# Patient Record
(demographics unavailable — no encounter records)

---

## 2024-12-23 NOTE — HISTORY OF PRESENT ILLNESS
[de-identified] : 48 yo F with PMH of HTN, HLD who recently visited to [facility] ED (10/2/24) for headache x9 days. Pt reports headache that started 9 days ago. Headache is located on the right side of her head and occasionally radiates to the R eye, but today she reports some pain on the left side as well. Pain is constant and feels like a tightness, but had an episode of severe, stabbing-like pain x2 days ago. Headache is worse with talking and movement, and is slightly better with Tylenol. Pt also reports lightheadedness and feeling unbalanced with movement that started a week ago. Pt denies excessive tearing, vision changes, n/v/d, chest pain, weakness, changes in sensation. Denies recent travel or illness. FHx HTN, DM, CAD in father. CTA showed 2 mm infundibulum in LEFT posterior communicating artery off the supraclinoid segment of L ICA and additional 1 mm outpouching in supraclinoid L ICA origin of the left anterior choroidal artery. Dissue beaded cervical internal carotid artery consistent with fibromuscular dysplasia but no significant stenosis/occlusion.  Initial visit (11/18/24) CTA head was reviewed which showed fibromuscular dysplasia and two small aneurysms and plan was made to obtain brain MRI and MRA head/neck.

## 2024-12-23 NOTE — DATA REVIEWED
[de-identified] : head wo, MRA head/neck wo on 12/8/24 in PACS  ACC: [id number]     EXAM:  MR ANGIO NECK   ORDERED BY: SUMI LUNA  ACC: [id number]     EXAM:  MR BRAIN WAW IC   ORDERED BY: SUMI LUNA  ACC: [id number]     EXAM:  MR ANGIO BRAIN   ORDERED BY: SUMI LUNA  PROCEDURE DATE:  12/08/2024    INTERPRETATION:  Three examinations were performed: 1.  MR angiography neck circulation without gadolinium contrast 2.  MR angiography intracranial circulation without gadolinium contrast 3.  MR brain with and without gadolinium contrast   CLINICAL INFORMATION:    HEADACHE AMR  TECHNIQUE: 1.  Neck circulation:   MR angiography was performed from the arch to the skull base using two-dimensional time-of-flight technique. This data set was reconstructed as maximum intensity pixel images and displayed in multiple rotations.   Supplemental three-dimensional acquisition centered at the carotid bifurcations was also performed, reconstructed as maximum intensity pixel images and displayed in multiple rotations. 2.  Intracranial circulation:  MR angiography was performed using three-dimensional time-of-flight technique.  This data set was reconstructed as maximum intensity pixel images and displayed in multiple rotations. 3.  Brain:  Sagittal and axial T1-weighted images, axial FLAIR images, axial susceptibility weighted images, axial T2-weighted images and axial diffusion weighted images of the brain were obtained. Following gadolinium administration volumetric axial T1-weighted inversion recovery fast gradient recalled echo images were obtained. This data set was reconstructed in the arteries on the sagittal and coronal planes. CONTRAST:    Gadavist:     7 cc administered  ;  0 cc discarded  COMPARISON:  CT head and CT angiography 10/2/2024   FINDINGS:  NECK ARTERIAL CIRCULATION  The RIGHT CAROTID circulation demonstrates an intact common carotid artery. The carotid bulb appears intact.   The internal carotid artery demonstrates mural irregularity over its entire length, greatest in its distal aspect, without focal stenosis. The vessel remains patent to the skull base.  The LEFT CAROTID circulation demonstrates an intact common carotid artery. Incidentally noted is origin of the left common carotid artery from a common trunk with the innominate artery (bovine arch).   The carotid bulb demonstrates mural irregularity greatest in its middle third, without focal stenosis.   The internal carotid artery is patent to the skull base.  The vertebral circulation demonstrates patent right and left vertebral arteries.  The degree of vertebral asymmetry is within physiologic variation.  Each vertebral artery demonstrates near constant caliber from its origin to the foramen magnum.  INTRACRANIAL ARTERIAL CIRCULATION  The ANTERIOR circulation demonstrates intact inflow from the ascending cervical segment to the petrous segment of each internal carotid artery. The cavernous and clinoid segments of the right internal carotid artery appear intact. At the left internal carotid clinoid segment along its posterior surface near the expected origin of the left posterior communicating artery (image 147) is lobulated outpouching that measures approximately 0.2 cm parallel to the dorsal surface by 0.1 cm perpendicular. There is asymmetry of internal carotid arterial caliber, left larger than right, secondary to developmental variation and asymmetry of the intracranial vasculature distributions.  The left anterior cerebral arterial A1 segment is dominant caliber. The A1 segment on the right is absent.  An anterior communicating artery is present. The anterior cerebral arterial A2 segments are near symmetric in caliber and patent to peripheral branching. The right middle cerebral artery demonstrates an intact initial M1 segment and patent peripheral anterior and posterior division sylvian branches.  The left middle cerebral artery demonstrates an intact initial M1 segment and patent peripheral anterior and posterior division sylvian branches.  The POSTERIOR circulation demonstrates intact inflow from each vertebral artery.   The right vertebral artery is dominant caliber. The small caliber left vertebral artery is tiny caliber distal to its plain subacute she was to the basilar formation.    PICA artery is demonstrated on the left, not definitely identified on the right.  The basilar artery appears intact.  Superior cerebellar arteries are demonstrated.  Posterior communicating artery tiny caliber is demonstrated on the left, not clearly seen on the right.   Each posterior cerebral artery is patent to peripheral branching.  No arteriovenous malformation  is recognized.  Note that small intracranial aneurysms less than 0.5 cm may not be detected by this technique.  BRAIN  BRAIN PARENCHYMA:   The brain demonstrates few small indistinct lesions within the subcortical white matter of the cerebral hemispheres. These lesions are hyperintense on the long TR images, otherwise inconspicuous. No cerebral cortical lesion is recognized.   No diffusion restriction is found in the brain.  No acute cerebral cortical infarct is found.   No intracranial hemorrhage is recognized.  No mass effect is found in the brain.   Following gadolinium administration no abnormal brain parenchymal enhancement is found.  CSF SPACES:   The ventricles, sulci and basal cisterns appear intact.  VESSELS:   The vertebral and internal carotid arteries demonstrate expected flow voids on the long TR images indicating their patency. The principal dural sinuses enhance consistent with patency  HEAD AND NECK STRUCTURES:   The orbits demonstrate asymmetric anterior to posterior elongation of the right globe.  Paranasal sinuses are clear.  The nasal cavity demonstrates additional left right septal deviation. Bilateral middle turbinate kiko bullosa are present.  The central skull base appears intact.  The nasopharynx is symmetric.  The temporal bones appear clear of disease.  The calvarium appears unremarkable.  ADDITIONAL FINDINGS:    None   IMPRESSION:  1.  RIGHT CAROTID NECK CIRCULATION:   No hemodynamically significant stenosis. Mural irregularity suggests probable muscular dysplasia.  2.  LEFT CAROTID NECK CIRCULATION:    No hemodynamically significant stenosis. Mural irregularity suggests probable muscular dysplasia.  3.  VERTEBRAL NECK CIRCULATION:   Intact  4.  ANTERIOR INTRACRANIAL CIRCULATION:     Patent. Left internal carotid distal clinoid segment tiny lobulation, infundibulum of the posterior communicating artery favored over saccular aneurysm  5.  POSTERIOR INTRACRANIAL CIRCULATION:   Intact.  6.  BRAIN:   No evidence of acute infarction. No pathologic enhancement.   Cerebral hemispheric subcortical white matter lesions are nonspecific. The pattern suggests migraine disease. Subcortical lesions of ischemic white matter disease could have a similar appearance. The differential diagnosis for these lesions remains broad. This differential diagnosis would also include other inflammatory, infectious, demyelinating, metabolic and conceivably other etiologies. Please note, however, that the extent of this disease is slight.  --- End of Report ---      PARVEEN WHALEN MD; Attending Radiologist This document has been electronically signed. Dec  9 2024 10:40AM

## 2024-12-23 NOTE — REASON FOR VISIT
[Follow-Up: _____] : a [unfilled] follow-up visit [FreeTextEntry1] : Recent image finding of aneurysm returns to review MRI brain, MRA head/neck (PACS)

## 2024-12-23 NOTE — DATA REVIEWED
[de-identified] : head wo, MRA head/neck wo on 12/8/24 in PACS  ACC: [id number]     EXAM:  MR ANGIO NECK   ORDERED BY: SUMI LUNA  ACC: [id number]     EXAM:  MR BRAIN WAW IC   ORDERED BY: SUMI LUNA  ACC: [id number]     EXAM:  MR ANGIO BRAIN   ORDERED BY: SUMI LUNA  PROCEDURE DATE:  12/08/2024    INTERPRETATION:  Three examinations were performed: 1.  MR angiography neck circulation without gadolinium contrast 2.  MR angiography intracranial circulation without gadolinium contrast 3.  MR brain with and without gadolinium contrast   CLINICAL INFORMATION:    HEADACHE AMR  TECHNIQUE: 1.  Neck circulation:   MR angiography was performed from the arch to the skull base using two-dimensional time-of-flight technique. This data set was reconstructed as maximum intensity pixel images and displayed in multiple rotations.   Supplemental three-dimensional acquisition centered at the carotid bifurcations was also performed, reconstructed as maximum intensity pixel images and displayed in multiple rotations. 2.  Intracranial circulation:  MR angiography was performed using three-dimensional time-of-flight technique.  This data set was reconstructed as maximum intensity pixel images and displayed in multiple rotations. 3.  Brain:  Sagittal and axial T1-weighted images, axial FLAIR images, axial susceptibility weighted images, axial T2-weighted images and axial diffusion weighted images of the brain were obtained. Following gadolinium administration volumetric axial T1-weighted inversion recovery fast gradient recalled echo images were obtained. This data set was reconstructed in the arteries on the sagittal and coronal planes. CONTRAST:    Gadavist:     7 cc administered  ;  0 cc discarded  COMPARISON:  CT head and CT angiography 10/2/2024   FINDINGS:  NECK ARTERIAL CIRCULATION  The RIGHT CAROTID circulation demonstrates an intact common carotid artery. The carotid bulb appears intact.   The internal carotid artery demonstrates mural irregularity over its entire length, greatest in its distal aspect, without focal stenosis. The vessel remains patent to the skull base.  The LEFT CAROTID circulation demonstrates an intact common carotid artery. Incidentally noted is origin of the left common carotid artery from a common trunk with the innominate artery (bovine arch).   The carotid bulb demonstrates mural irregularity greatest in its middle third, without focal stenosis.   The internal carotid artery is patent to the skull base.  The vertebral circulation demonstrates patent right and left vertebral arteries.  The degree of vertebral asymmetry is within physiologic variation.  Each vertebral artery demonstrates near constant caliber from its origin to the foramen magnum.  INTRACRANIAL ARTERIAL CIRCULATION  The ANTERIOR circulation demonstrates intact inflow from the ascending cervical segment to the petrous segment of each internal carotid artery. The cavernous and clinoid segments of the right internal carotid artery appear intact. At the left internal carotid clinoid segment along its posterior surface near the expected origin of the left posterior communicating artery (image 147) is lobulated outpouching that measures approximately 0.2 cm parallel to the dorsal surface by 0.1 cm perpendicular. There is asymmetry of internal carotid arterial caliber, left larger than right, secondary to developmental variation and asymmetry of the intracranial vasculature distributions.  The left anterior cerebral arterial A1 segment is dominant caliber. The A1 segment on the right is absent.  An anterior communicating artery is present. The anterior cerebral arterial A2 segments are near symmetric in caliber and patent to peripheral branching. The right middle cerebral artery demonstrates an intact initial M1 segment and patent peripheral anterior and posterior division sylvian branches.  The left middle cerebral artery demonstrates an intact initial M1 segment and patent peripheral anterior and posterior division sylvian branches.  The POSTERIOR circulation demonstrates intact inflow from each vertebral artery.   The right vertebral artery is dominant caliber. The small caliber left vertebral artery is tiny caliber distal to its plain subacute she was to the basilar formation.    PICA artery is demonstrated on the left, not definitely identified on the right.  The basilar artery appears intact.  Superior cerebellar arteries are demonstrated.  Posterior communicating artery tiny caliber is demonstrated on the left, not clearly seen on the right.   Each posterior cerebral artery is patent to peripheral branching.  No arteriovenous malformation  is recognized.  Note that small intracranial aneurysms less than 0.5 cm may not be detected by this technique.  BRAIN  BRAIN PARENCHYMA:   The brain demonstrates few small indistinct lesions within the subcortical white matter of the cerebral hemispheres. These lesions are hyperintense on the long TR images, otherwise inconspicuous. No cerebral cortical lesion is recognized.   No diffusion restriction is found in the brain.  No acute cerebral cortical infarct is found.   No intracranial hemorrhage is recognized.  No mass effect is found in the brain.   Following gadolinium administration no abnormal brain parenchymal enhancement is found.  CSF SPACES:   The ventricles, sulci and basal cisterns appear intact.  VESSELS:   The vertebral and internal carotid arteries demonstrate expected flow voids on the long TR images indicating their patency. The principal dural sinuses enhance consistent with patency  HEAD AND NECK STRUCTURES:   The orbits demonstrate asymmetric anterior to posterior elongation of the right globe.  Paranasal sinuses are clear.  The nasal cavity demonstrates additional left right septal deviation. Bilateral middle turbinate kiko bullosa are present.  The central skull base appears intact.  The nasopharynx is symmetric.  The temporal bones appear clear of disease.  The calvarium appears unremarkable.  ADDITIONAL FINDINGS:    None   IMPRESSION:  1.  RIGHT CAROTID NECK CIRCULATION:   No hemodynamically significant stenosis. Mural irregularity suggests probable muscular dysplasia.  2.  LEFT CAROTID NECK CIRCULATION:    No hemodynamically significant stenosis. Mural irregularity suggests probable muscular dysplasia.  3.  VERTEBRAL NECK CIRCULATION:   Intact  4.  ANTERIOR INTRACRANIAL CIRCULATION:     Patent. Left internal carotid distal clinoid segment tiny lobulation, infundibulum of the posterior communicating artery favored over saccular aneurysm  5.  POSTERIOR INTRACRANIAL CIRCULATION:   Intact.  6.  BRAIN:   No evidence of acute infarction. No pathologic enhancement.   Cerebral hemispheric subcortical white matter lesions are nonspecific. The pattern suggests migraine disease. Subcortical lesions of ischemic white matter disease could have a similar appearance. The differential diagnosis for these lesions remains broad. This differential diagnosis would also include other inflammatory, infectious, demyelinating, metabolic and conceivably other etiologies. Please note, however, that the extent of this disease is slight.  --- End of Report ---      PARVEEN WHALEN MD; Attending Radiologist This document has been electronically signed. Dec  9 2024 10:40AM

## 2024-12-23 NOTE — ASSESSMENT
[FreeTextEntry1] : stable small aneurysms and fibromuscular dysplasia.  PLAN - repeat MRA head wo in one year for aneurysm evaluation - Motrin PRN for neck pain 2/2 fibromuscular dysplasia - f/u after images to review  I, Dr. Pollo Hudson, personally performed the evaluation and management (E/M) services for this established patient who presents today with (a) new problem(s)/exacerbation of (an) existing condition(s). That E/M includes conducting the clinically appropriate interval history &/or exam, assessing all new/exacerbated conditions, and establishing a new plan of care. Today, my COLEMAN, Hyunchu Emy-Gold, was here to observe my evaluation and management service for this new problem/exacerbated condition and follow the plan of care established by me going forward.

## 2024-12-23 NOTE — HISTORY OF PRESENT ILLNESS
[de-identified] : 48 yo F with PMH of HTN, HLD who recently visited to [facility] ED (10/2/24) for headache x9 days. Pt reports headache that started 9 days ago. Headache is located on the right side of her head and occasionally radiates to the R eye, but today she reports some pain on the left side as well. Pain is constant and feels like a tightness, but had an episode of severe, stabbing-like pain x2 days ago. Headache is worse with talking and movement, and is slightly better with Tylenol. Pt also reports lightheadedness and feeling unbalanced with movement that started a week ago. Pt denies excessive tearing, vision changes, n/v/d, chest pain, weakness, changes in sensation. Denies recent travel or illness. FHx HTN, DM, CAD in father. CTA showed 2 mm infundibulum in LEFT posterior communicating artery off the supraclinoid segment of L ICA and additional 1 mm outpouching in supraclinoid L ICA origin of the left anterior choroidal artery. Dissue beaded cervical internal carotid artery consistent with fibromuscular dysplasia but no significant stenosis/occlusion.  Initial visit (11/18/24) CTA head was reviewed which showed fibromuscular dysplasia and two small aneurysms and plan was made to obtain brain MRI and MRA head/neck.

## 2024-12-23 NOTE — END OF VISIT
[Time Spent: ___ minutes] : I have spent [unfilled] minutes of time on the encounter which excludes teaching and separately reported services. Continue Regimen: Patient can continue to use topical antibiotic previously prescribed by another physician Detail Level: Zone Continue Regimen: Patient may continue Mirvaso previously prescribed by another physician Plan: Ok to send new Mario for Mirvaso if patient requests